# Patient Record
(demographics unavailable — no encounter records)

---

## 2025-07-25 NOTE — DISCUSSION/SUMMARY
[Normal Growth] : growth [Normal Development] : development  [No Elimination Concerns] : elimination [No Skin Concerns] : skin [Normal Sleep Pattern] : sleep [None] : no medical problems [Anticipatory Guidance Given] : Anticipatory guidance addressed as per the history of present illness section [No Medications] : ~He/She~ is not on any medications [de-identified] : Behavioral chart-a sticker for each episode of 5 bites of self feeding, 10 stickers leads to a treat.  Remove emotion form it, no coaxing

## 2025-07-25 NOTE — HISTORY OF PRESENT ILLNESS
[Mother] : mother [Fruit] : fruit [Vegetables] : vegetables [Meat] : meat [Grains] : grains [Eggs] : eggs [Fish] : fish [Dairy] : dairy [___ stools per day] : [unfilled]  stools per day [Normal] : Normal [In own bed] : In own bed [Yes] : Patient goes to dentist yearly [Tap water] : Primary Fluoride Source: Tap water [In Pre-K] : In Pre-K [Appropiate parent-child communication] : Appropriate parent-child communication [Child given choices] : Child given choices [No] : Not at  exposure [Water heater temperature set at <120 degrees F] : Water heater temperature set at <120 degrees F [Car seat in back seat] : Car seat in back seat [Carbon Monoxide Detectors] : Carbon monoxide detectors [Smoke Detectors] : Smoke detectors [Supervised outdoor play] : Supervised outdoor play [Exposure to electronic nicotine delivery system] : Exposure to electronic nicotine delivery system [Up to date] : Up to date [NO] : No [de-identified] : Wont self feed with parents except for mac and cheese and icecream.  Teachers report no problems at school [FreeTextEntry9] : except for feeding, parntes have always fed child when she refused.  No abdominal pain or diarrhea when given all classes of foods [de-identified] : father vapes

## 2025-07-25 NOTE — PLAN
[TextEntry] :  Continue balanced diet with all food groups. Daily calcium requirement 1000mg, vit D 600 IU.   Brush teeth twice a day with toothbrush. Recommend annual dental visit.  Help child to maintain consistent daily routines and sleep schedule.  School discussed. Ensure home is safe. Teach child about personal safety. Use consistent, positive discipline. Limit screen time to no more than 2 hours per day. Encourage physical activity. Child needs to ride in a belt-positioning booster seat until 4 feet 9 inches has been reached and are between 8 and 12 years of age. Encourage reading about topics that interest child.   Return 1 year for routine well child check.

## 2025-07-25 NOTE — PHYSICAL EXAM
